# Patient Record
Sex: MALE | Race: WHITE | NOT HISPANIC OR LATINO
[De-identification: names, ages, dates, MRNs, and addresses within clinical notes are randomized per-mention and may not be internally consistent; named-entity substitution may affect disease eponyms.]

---

## 2018-07-10 PROBLEM — Z00.00 ENCOUNTER FOR PREVENTIVE HEALTH EXAMINATION: Status: ACTIVE | Noted: 2018-07-10

## 2018-07-17 ENCOUNTER — APPOINTMENT (OUTPATIENT)
Dept: MRI IMAGING | Facility: HOSPITAL | Age: 23
End: 2018-07-17
Payer: COMMERCIAL

## 2018-07-17 ENCOUNTER — OUTPATIENT (OUTPATIENT)
Dept: OUTPATIENT SERVICES | Facility: HOSPITAL | Age: 23
LOS: 1 days | End: 2018-07-17
Payer: COMMERCIAL

## 2018-07-17 DIAGNOSIS — Z00.8 ENCOUNTER FOR OTHER GENERAL EXAMINATION: ICD-10-CM

## 2018-07-17 PROCEDURE — 73221 MRI JOINT UPR EXTREM W/O DYE: CPT | Mod: 26,RT

## 2018-07-17 PROCEDURE — 73221 MRI JOINT UPR EXTREM W/O DYE: CPT

## 2018-08-07 ENCOUNTER — APPOINTMENT (OUTPATIENT)
Dept: ORTHOPEDIC SURGERY | Facility: CLINIC | Age: 23
End: 2018-08-07
Payer: COMMERCIAL

## 2018-08-07 DIAGNOSIS — S69.81XA OTHER SPECIFIED INJURIES OF RIGHT WRIST, HAND AND FINGER(S), INITIAL ENCOUNTER: ICD-10-CM

## 2018-08-07 PROCEDURE — 99203 OFFICE O/P NEW LOW 30 MIN: CPT

## 2018-08-16 ENCOUNTER — APPOINTMENT (OUTPATIENT)
Dept: RADIOLOGY | Facility: HOSPITAL | Age: 23
End: 2018-08-16
Payer: COMMERCIAL

## 2018-08-16 ENCOUNTER — OUTPATIENT (OUTPATIENT)
Dept: OUTPATIENT SERVICES | Facility: HOSPITAL | Age: 23
LOS: 1 days | End: 2018-08-16
Payer: COMMERCIAL

## 2018-08-16 DIAGNOSIS — Z00.8 ENCOUNTER FOR OTHER GENERAL EXAMINATION: ICD-10-CM

## 2018-08-16 PROCEDURE — 73630 X-RAY EXAM OF FOOT: CPT | Mod: 26,RT

## 2018-08-16 PROCEDURE — 73630 X-RAY EXAM OF FOOT: CPT

## 2019-07-16 ENCOUNTER — APPOINTMENT (OUTPATIENT)
Dept: ORTHOPEDIC SURGERY | Facility: CLINIC | Age: 24
End: 2019-07-16
Payer: COMMERCIAL

## 2019-07-16 DIAGNOSIS — M77.8 OTHER ENTHESOPATHIES, NOT ELSEWHERE CLASSIFIED: ICD-10-CM

## 2019-07-16 PROCEDURE — 99213 OFFICE O/P EST LOW 20 MIN: CPT

## 2019-07-16 NOTE — ADDENDUM
[FreeTextEntry1] : I, Ade Perdomo wrote this note acting as a scribe for Dr. Andrew Crowder on Jul 16, 2019.

## 2019-07-16 NOTE — DISCUSSION/SUMMARY
[de-identified] : The patient wishes to proceed with physical therapy. A script was given. \par Continue with the wrist braces. \par Avoid strenuous activities.  \par NSAIDs as tolerated. \par Patient may return for a cortisone injection if his symptoms persist or worsen.

## 2019-07-16 NOTE — PHYSICAL EXAM
[de-identified] : Patient is well-nourished, well developed, alert and in no acute distress. Breathing is unlabored. He is grossly oriented to person, place and time.\par \par Right Wrist: Wrist brace removed. There is tenderness of the ulnar styloid. No edema or discolorations. FROM with pain on resisted wrist extension. Sensation is normal to light touch. \par Strength: extension, flexion, ulnar deviation and radial deviation 5/5. \par \par Left Wrist: Wrist brace removed for examination. There is tenderness along the dorsi-ulnar aspect. No swelling or deformities. The ROM is full and painless in all planes with no crepitus. There is no joint instability on provocative testing. \par Strength: extension, flexion, ulnar deviation and radial deviation 5/5.

## 2019-07-16 NOTE — HISTORY OF PRESENT ILLNESS
[de-identified] : The patient is a RHD 24 year old male who presents for evaluation of pain involving the right and left wrist  that developed in April 2018. He was last seen in this office on August 2018. The patient states that his symptoms were of sudden onset. Since then, he states that wrists were doing much better until he shoveled snow this past winter and re-aggravated his symptoms. The right wrist is currently more painful than the left. He wears a wrist brace over both. The symptoms are localized over the dorsi-ulnar aspect and particularly over the ulnar styloid on the right. The right wrist also swells. The patient states his symptoms are worsened by lifting. 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

## 2019-07-16 NOTE — END OF VISIT
[FreeTextEntry3] : I, Andrew Crowder MD, ordering physician, have read and attest that all the information, medical decision making and discharge instructions within are true and accurate.

## 2020-07-02 ENCOUNTER — TRANSCRIPTION ENCOUNTER (OUTPATIENT)
Age: 25
End: 2020-07-02

## 2022-08-24 ENCOUNTER — EMERGENCY (EMERGENCY)
Facility: HOSPITAL | Age: 27
LOS: 1 days | Discharge: HOME | End: 2022-08-24
Attending: EMERGENCY MEDICINE | Admitting: EMERGENCY MEDICINE
Payer: MEDICAID

## 2022-08-24 VITALS
SYSTOLIC BLOOD PRESSURE: 149 MMHG | HEART RATE: 85 BPM | DIASTOLIC BLOOD PRESSURE: 98 MMHG | OXYGEN SATURATION: 98 % | RESPIRATION RATE: 18 BRPM

## 2022-08-24 VITALS
RESPIRATION RATE: 17 BRPM | WEIGHT: 259.93 LBS | TEMPERATURE: 98 F | DIASTOLIC BLOOD PRESSURE: 106 MMHG | SYSTOLIC BLOOD PRESSURE: 167 MMHG | HEART RATE: 79 BPM | HEIGHT: 75 IN | OXYGEN SATURATION: 97 %

## 2022-08-24 PROCEDURE — 99283 EMERGENCY DEPT VISIT LOW MDM: CPT

## 2022-08-24 PROCEDURE — 73110 X-RAY EXAM OF WRIST: CPT | Mod: 26,RT

## 2022-08-24 PROCEDURE — 73110 X-RAY EXAM OF WRIST: CPT

## 2022-08-24 NOTE — ED PROVIDER NOTE - PATIENT PORTAL LINK FT
You can access the FollowMyHealth Patient Portal offered by [facility] by registering at the following website: [email or web address]. By joining [facility]’s FollowMyHealth portal, you will also be able to view your health information using other applications (apps) compatible with our system.

## 2022-08-24 NOTE — ED PROVIDER NOTE - NSFOLLOWUPINSTRUCTIONS_ED_ALL_ED_FT
- wear wrist splint  - see orthopedist for follow up this week  - take motrin as needed for pain      Wrist Sprain    WHAT YOU NEED TO KNOW:    A wrist sprain happens when one or more ligaments in your wrist stretch or tear. Ligaments are tough tissues that connect bones and keep them in place, and support your joints.    DISCHARGE INSTRUCTIONS:    Return to the emergency department if:   •You have severe pain or swelling.      •Your injured wrist is red or has red streaks spreading from the injured area.      •You have new trouble moving your hands, fingers, or wrist.      •Your wrist, hand, or fingers feel cold or numb.      •Your fingernails turn blue or gray.      Call your doctor if:   •Your symptoms get worse.      •You have pain and swelling for more than 48 hours.      •You have questions or concerns about your condition or care.      Medicines: You may need any of the following:   •NSAIDs, such as ibuprofen, help decrease swelling, pain, and fever. NSAIDs can cause stomach bleeding or kidney problems in certain people. If you take blood thinner medicine, always ask your healthcare provider if NSAIDs are safe for you. Always read the medicine label and follow directions.      •Acetaminophen decreases pain and fever. It is available without a doctor's order. Ask how much to take and how often to take it. Follow directions. Read the labels of all other medicines you are using to see if they also contain acetaminophen, or ask your doctor or pharmacist. Acetaminophen can cause liver damage if not taken correctly.      •Take your medicine as directed. Contact your healthcare provider if you think your medicine is not helping or if you have side effects. Tell him or her if you are allergic to any medicine. Keep a list of the medicines, vitamins, and herbs you take. Include the amounts, and when and why you take them. Bring the list or the pill bottles to follow-up visits. Carry your medicine list with you in case of an emergency.      Self-care:   •Rest your wrist for at least 48 hours. Avoid activities that cause pain.      •Ice your wrist for 15 to 20 minutes every hour or as directed. Use an ice pack, or put crushed ice in a plastic bag. Cover it with a towel before you put it on your wrist. Ice helps prevent tissue damage and decreases swelling and pain.      •Compress your wrist with an elastic bandage. This will help decrease swelling, support your wrist, and help it heal. Wear your wrist wrap as directed. The elastic bandage should be snug but not tight.  How to Wrap an Elastic Bandage           •Elevate your wrist above the level of your heart as often as you can. This will help decrease swelling and pain. Prop your wrist on pillows or blankets to keep it elevated comfortably.             Wrist support: You may need to wear a splint or cast to support your wrist and prevent more damage. Wear your splint as directed. Ask for instructions on how to bathe while you are wearing a splint or cast.    Physical therapy: Your healthcare provider may recommend that you go to physical therapy. A physical therapist teaches you exercises to help improve movement and strength, and to decrease pain.    Follow up with your doctor as directed: Write down your questions so you remember to ask them during your visits.

## 2022-08-24 NOTE — ED ADULT TRIAGE NOTE - CHIEF COMPLAINT QUOTE
c/o right wrist pain with movement x 2 weeks. pt reports pain start after punching a punching bag but has been worsening since. pt states "I cannot use my hand or move my wrist to do anything."

## 2022-08-24 NOTE — ED PROVIDER NOTE - OBJECTIVE STATEMENT
pt c/o R wrist pain for ~8 days, started after injurying it punching a punching bag. pain now only on heavy lifting . no numbness/weakness

## 2022-08-24 NOTE — ED PROVIDER NOTE - CARE PROVIDER_API CALL
Andrew Crowder)  Orthopaedic Surgery  [address]  [place] [address]  Phone: [phone]  Fax: [phone]  Follow Up Time:

## 2022-08-24 NOTE — ED ADULT NURSE NOTE - NSIMPLEMENTINTERV_GEN_ALL_ED
Implemented All Universal Safety Interventions:  [place] to call system. Call bell, personal items and telephone within reach. Instruct patient to call for assistance. Room bathroom lighting operational. Non-slip footwear when patient is off stretcher. Physically safe environment: no spills, clutter or unnecessary equipment. Stretcher in lowest position, wheels locked, appropriate side rails in place.

## 2022-08-24 NOTE — ED PROVIDER NOTE - CLINICAL SUMMARY MEDICAL DECISION MAKING FREE TEXT BOX
R wrist pain p injury. check xr r/o fx. likely sprain.      xray neg. splint placed by RN. motrin prn. f/u ortho

## 2022-08-24 NOTE — ED ADULT NURSE NOTE - OBJECTIVE STATEMENT
pt axo3, coming in from home for right wrist pain. wrist assessed- no swelling or redness present. pt is able to move his wrist up and down and side to side. pt states he hurt his wrist x2 weeks ago punching a punching bag. pt denies chest pain or SOB. safety maintained.

## 2022-08-24 NOTE — ED PROVIDER NOTE - MUSCULOSKELETAL, MLM
R elbow nontender, FROM s pain. R wrist: no swelling or deformity/discoloration. no focal ttp. mild pain with wrist flexion against resistance. hand/fingers nontender. 2+ rad pulses

## 2022-12-21 PROBLEM — Z78.9 OTHER SPECIFIED HEALTH STATUS: Chronic | Status: ACTIVE | Noted: 2022-08-24

## 2023-04-05 ENCOUNTER — APPOINTMENT (OUTPATIENT)
Dept: NEUROLOGY | Facility: CLINIC | Age: 28
End: 2023-04-05
Payer: MEDICAID

## 2023-04-05 VITALS
WEIGHT: 260 LBS | SYSTOLIC BLOOD PRESSURE: 170 MMHG | BODY MASS INDEX: 33.37 KG/M2 | HEIGHT: 74 IN | DIASTOLIC BLOOD PRESSURE: 100 MMHG

## 2023-04-05 PROCEDURE — 99204 OFFICE O/P NEW MOD 45 MIN: CPT

## 2023-04-05 NOTE — ASSESSMENT
[FreeTextEntry1] : Likely attention deficit disorder\par We will obtain an EEG and formal neuropsychological evaluation\par Further discussions to follow

## 2023-04-05 NOTE — HISTORY OF PRESENT ILLNESS
[FreeTextEntry1] : He reports difficulty focusing and concentrating since childhood\par School was always a struggle.  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

## 2023-04-05 NOTE — PHYSICAL EXAM
[General Appearance - Alert] : alert [General Appearance - In No Acute Distress] : in no acute distress [General Appearance - Well-Appearing] : healthy appearing [Oriented To Time, Place, And Person] : oriented to person, place, and time [Impaired Insight] : insight and judgment were intact [Affect] : the affect was normal [Memory Recent] : recent memory was not impaired [Person] : oriented to person [Place] : oriented to place [Time] : oriented to time [Concentration Intact] : normal concentrating ability [Fluency] : fluency intact [Comprehension] : comprehension intact [Cranial Nerves Optic (II)] : visual acuity intact bilaterally,  visual fields full to confrontation, pupils equal round and reactive to light [Cranial Nerves Oculomotor (III)] : extraocular motion intact [Cranial Nerves Trigeminal (V)] : facial sensation intact symmetrically [Cranial Nerves Facial (VII)] : face symmetrical [Cranial Nerves Vestibulocochlear (VIII)] : hearing was intact bilaterally [Cranial Nerves Glossopharyngeal (IX)] : tongue and palate midline [Cranial Nerves Accessory (XI - Cranial And Spinal)] : head turning and shoulder shrug symmetric [Cranial Nerves Hypoglossal (XII)] : there was no tongue deviation with protrusion [Motor Tone] : muscle tone was normal in all four extremities [Motor Strength] : muscle strength was normal in all four extremities [No Muscle Atrophy] : normal bulk in all four extremities [Paresis Pronator Drift Right-Sided] : no pronator drift on the right [Sensation Tactile Decrease] : light touch was intact [Sensation Pain / Temperature Decrease] : pain and temperature was intact [Romberg's Sign] : Romberg's sign was negtive [Abnormal Walk] : normal gait [Balance] : balance was intact [Past-pointing] : there was no past-pointing [Tremor] : no tremor present [Coordination - Dysmetria Impaired Finger-to-Nose Bilateral] : not present [Coordination - Dysmetria Impaired Heel-to-Shin Bilateral] : not present [2+] : Ankle jerk left 2+ [Plantar Reflex Right Only] : normal on the right [Plantar Reflex Left Only] : normal on the left [PERRL With Normal Accommodation] : pupils were equal in size, round, reactive to light, with normal accommodation [Extraocular Movements] : extraocular movements were intact [Full Visual Field] : full visual field

## 2023-04-06 ENCOUNTER — APPOINTMENT (OUTPATIENT)
Dept: NEUROLOGY | Facility: CLINIC | Age: 28
End: 2023-04-06
Payer: MEDICAID

## 2023-04-06 PROCEDURE — 95819 EEG AWAKE AND ASLEEP: CPT

## 2023-04-06 PROCEDURE — 93040 RHYTHM ECG WITH REPORT: CPT

## 2024-05-13 ENCOUNTER — NON-APPOINTMENT (OUTPATIENT)
Age: 29
End: 2024-05-13

## 2024-05-13 ENCOUNTER — APPOINTMENT (OUTPATIENT)
Dept: FAMILY MEDICINE | Facility: CLINIC | Age: 29
End: 2024-05-13
Payer: COMMERCIAL

## 2024-05-13 VITALS
SYSTOLIC BLOOD PRESSURE: 150 MMHG | RESPIRATION RATE: 16 BRPM | TEMPERATURE: 98.8 F | BODY MASS INDEX: 36.7 KG/M2 | HEIGHT: 74 IN | WEIGHT: 286 LBS | HEART RATE: 92 BPM | OXYGEN SATURATION: 97 % | DIASTOLIC BLOOD PRESSURE: 90 MMHG

## 2024-05-13 VITALS — DIASTOLIC BLOOD PRESSURE: 84 MMHG | SYSTOLIC BLOOD PRESSURE: 127 MMHG

## 2024-05-13 DIAGNOSIS — Z80.1 FAMILY HISTORY OF MALIGNANT NEOPLASM OF TRACHEA, BRONCHUS AND LUNG: ICD-10-CM

## 2024-05-13 DIAGNOSIS — Z84.89 FAMILY HISTORY OF OTHER SPECIFIED CONDITIONS: ICD-10-CM

## 2024-05-13 DIAGNOSIS — F43.23 ADJUSTMENT DISORDER WITH MIXED ANXIETY AND DEPRESSED MOOD: ICD-10-CM

## 2024-05-13 DIAGNOSIS — Z82.49 FAMILY HISTORY OF ISCHEMIC HEART DISEASE AND OTHER DISEASES OF THE CIRCULATORY SYSTEM: ICD-10-CM

## 2024-05-13 DIAGNOSIS — F43.10 POST-TRAUMATIC STRESS DISORDER, UNSPECIFIED: ICD-10-CM

## 2024-05-13 DIAGNOSIS — Z83.3 FAMILY HISTORY OF DIABETES MELLITUS: ICD-10-CM

## 2024-05-13 PROCEDURE — G2211 COMPLEX E/M VISIT ADD ON: CPT

## 2024-05-13 PROCEDURE — 99204 OFFICE O/P NEW MOD 45 MIN: CPT

## 2024-05-14 NOTE — HISTORY OF PRESENT ILLNESS
[FreeTextEntry8] : 29-year-old male presenting for an initial visit to establish care and address concerns related to depression, potential ADHD, and gastrointestinal discomfort. Patient reports feeling depressed and has been unemployed since December, with intermittent unemployment over the past couple of years. His therapist suggested that he may have ADHD, characterized by an inability to focus, stay on task, and complete tasks due to frustration and loss of interest. He reports family issues and a history of trauma, including his father's suicide when he was 3 years old and estrangement from his paternal family after legally changing his last name. He experiences symptoms of depression nearly every day, as indicated by the PHQ-9 survey. He has had thoughts of being a burden and feeling like he would be better off not being here, but denies any specific suicidal ideation or self-harm attempts. When feeling down, he tries to immerse himself in activities he enjoys, such as 3D printing and modeling, but struggles to maintain focus. History of tonsillectomy. Paternal family estranged after patient legally changed his last name. Family history of substance abuse (alcoholism and drug use) on both sides of family. Multiple aunts and uncles  from cancer including lung cancer. One uncle  from a heart attack in . One aunt had a brain tumor and experienced cognitive impairment. Mother has diabetes. One family member had a stroke or palsy. Not currently sexually active. No concerns for STDs. Patient reports burping, especially when lying down or during workouts involving shallow breathing. He denies acid reflux or stomach bloating. Blood pressure 150/90 mmHg on intake, 127/84 on repeat.

## 2024-06-06 LAB
ALBUMIN SERPL ELPH-MCNC: 4.8 G/DL
ALP BLD-CCNC: 52 U/L
ALT SERPL-CCNC: 95 U/L
ANION GAP SERPL CALC-SCNC: 12 MMOL/L
AST SERPL-CCNC: 31 U/L
BILIRUB SERPL-MCNC: 0.6 MG/DL
BUN SERPL-MCNC: 14 MG/DL
CALCIUM SERPL-MCNC: 9.4 MG/DL
CHLORIDE SERPL-SCNC: 103 MMOL/L
CO2 SERPL-SCNC: 26 MMOL/L
CREAT SERPL-MCNC: 0.91 MG/DL
EGFR: 118 ML/MIN/1.73M2
GLUCOSE SERPL-MCNC: 75 MG/DL
HCT VFR BLD CALC: 46.8 %
HGB BLD-MCNC: 16.2 G/DL
MCHC RBC-ENTMCNC: 29.5 PG
MCHC RBC-ENTMCNC: 34.6 GM/DL
MCV RBC AUTO: 85.1 FL
PLATELET # BLD AUTO: 214 K/UL
POTASSIUM SERPL-SCNC: 4.3 MMOL/L
PROT SERPL-MCNC: 7.1 G/DL
RBC # BLD: 5.5 M/UL
RBC # FLD: 12.5 %
SODIUM SERPL-SCNC: 141 MMOL/L
TSH SERPL-ACNC: 0.71 UIU/ML
WBC # FLD AUTO: 6.05 K/UL

## 2024-06-10 ENCOUNTER — TRANSCRIPTION ENCOUNTER (OUTPATIENT)
Age: 29
End: 2024-06-10

## 2024-06-10 ENCOUNTER — APPOINTMENT (OUTPATIENT)
Dept: FAMILY MEDICINE | Facility: CLINIC | Age: 29
End: 2024-06-10
Payer: COMMERCIAL

## 2024-06-10 VITALS — DIASTOLIC BLOOD PRESSURE: 88 MMHG | SYSTOLIC BLOOD PRESSURE: 132 MMHG

## 2024-06-10 VITALS
WEIGHT: 287 LBS | DIASTOLIC BLOOD PRESSURE: 98 MMHG | TEMPERATURE: 97.3 F | SYSTOLIC BLOOD PRESSURE: 149 MMHG | RESPIRATION RATE: 16 BRPM | BODY MASS INDEX: 36.83 KG/M2 | OXYGEN SATURATION: 98 % | HEART RATE: 86 BPM | HEIGHT: 74 IN

## 2024-06-10 DIAGNOSIS — R41.840 ATTENTION AND CONCENTRATION DEFICIT: ICD-10-CM

## 2024-06-10 DIAGNOSIS — F32.A DEPRESSION, UNSPECIFIED: ICD-10-CM

## 2024-06-10 DIAGNOSIS — E66.9 OBESITY, UNSPECIFIED: ICD-10-CM

## 2024-06-10 DIAGNOSIS — L72.11 PILAR CYST: ICD-10-CM

## 2024-06-10 PROCEDURE — 99214 OFFICE O/P EST MOD 30 MIN: CPT

## 2024-06-10 PROCEDURE — G2211 COMPLEX E/M VISIT ADD ON: CPT

## 2024-06-10 RX ORDER — FLUOXETINE HYDROCHLORIDE 20 MG/1
20 TABLET ORAL
Qty: 90 | Refills: 3 | Status: ACTIVE | COMMUNITY
Start: 2024-05-13 | End: 1900-01-01

## 2024-06-10 NOTE — HISTORY OF PRESENT ILLNESS
[de-identified] : 29-year-old male presents for ongoing management of chronic medical conditions. He was started on fluoxetine 10 mg daily at his last visit and was given a neuropsychological evaluation to test for ADHD. Patient reports feeling less sad on fluoxetine and 5-HTP supplement. However, he experiences periods of feeling flat, edgy, and agitated at times. Suicidal thoughts have lessened but are still present, though he has no intent to act on them. Patient had difficulty obtaining referrals for ADHD evaluation due to insurance issues. He struggles with motivation for exercise and healthy habits, attributing it to potential ADHD and ruminating negative thoughts during workouts. Patient mentions a past relationship contributing to weight gain. He previously lost significant weight through restrictive dieting and excessive exercise, going from 300 lbs to 180 lbs. He engages in activities like playing games with friends for dopamine release. Recent blood test results showed ALT of 95. Other metabolic panel, kidney function, thyroid, and blood count results were within normal limits.

## 2024-06-10 NOTE — PHYSICAL EXAM
[Normal] : affect was normal and insight and judgment were intact [de-identified] : Two smooth, mobile, round cysts were observed on the patient's cranium, likely pilar cysts, one on each side.

## 2024-07-17 ENCOUNTER — APPOINTMENT (OUTPATIENT)
Dept: NEUROLOGY | Facility: CLINIC | Age: 29
End: 2024-07-17

## 2024-07-17 VITALS
WEIGHT: 280 LBS | HEART RATE: 93 BPM | SYSTOLIC BLOOD PRESSURE: 170 MMHG | BODY MASS INDEX: 35.94 KG/M2 | OXYGEN SATURATION: 98 % | DIASTOLIC BLOOD PRESSURE: 120 MMHG | HEIGHT: 74 IN

## 2024-07-22 ENCOUNTER — NON-APPOINTMENT (OUTPATIENT)
Age: 29
End: 2024-07-22

## 2024-07-22 ENCOUNTER — APPOINTMENT (OUTPATIENT)
Dept: FAMILY MEDICINE | Facility: CLINIC | Age: 29
End: 2024-07-22
Payer: COMMERCIAL

## 2024-07-22 VITALS
RESPIRATION RATE: 16 BRPM | SYSTOLIC BLOOD PRESSURE: 137 MMHG | BODY MASS INDEX: 36.21 KG/M2 | DIASTOLIC BLOOD PRESSURE: 93 MMHG | HEART RATE: 90 BPM | TEMPERATURE: 98.8 F | OXYGEN SATURATION: 97 % | WEIGHT: 282 LBS

## 2024-07-22 DIAGNOSIS — R41.840 ATTENTION AND CONCENTRATION DEFICIT: ICD-10-CM

## 2024-07-22 DIAGNOSIS — E66.9 OBESITY, UNSPECIFIED: ICD-10-CM

## 2024-07-22 DIAGNOSIS — I10 ESSENTIAL (PRIMARY) HYPERTENSION: ICD-10-CM

## 2024-07-22 PROCEDURE — G2211 COMPLEX E/M VISIT ADD ON: CPT

## 2024-07-22 PROCEDURE — 99214 OFFICE O/P EST MOD 30 MIN: CPT

## 2024-07-22 RX ORDER — AMLODIPINE BESYLATE 5 MG/1
5 TABLET ORAL DAILY
Qty: 30 | Refills: 0 | Status: ACTIVE | COMMUNITY
Start: 2024-07-22 | End: 1900-01-01

## 2024-07-22 NOTE — HISTORY OF PRESENT ILLNESS
[de-identified] : 29-year-old male presented for ongoing management of chronic medical conditions, including follow-up on fluoxetine dosage adjustment and evaluation for ADHD. His blood pressures have been high as well and was noticed to be very high with his neurologist on July 17th. Patient's blood pressure was elevated at 137/93 during the current visit, previously 170/120 on July 17th, and 132/88 on June 10th. The high blood pressure reading of 170/120 was during an EEG appointment with Dr. Jolley, where the visit was canceled, but the high blood pressure was acknowledged. Regarding ADHD, he has spoken with a few placed that wanted to charge him a lot of money, facing challenges in finding the right provider and coordinating the process. Patient expresses frustration with the difficulties in focusing and completing schoolwork, leading to second-guessing himself. ALT: 95, AST: 31. EEG (previously done): Normal.

## 2024-08-12 ENCOUNTER — APPOINTMENT (OUTPATIENT)
Dept: FAMILY MEDICINE | Facility: CLINIC | Age: 29
End: 2024-08-12
Payer: COMMERCIAL

## 2024-08-12 VITALS
OXYGEN SATURATION: 96 % | HEART RATE: 95 BPM | RESPIRATION RATE: 16 BRPM | TEMPERATURE: 98.6 F | BODY MASS INDEX: 36.06 KG/M2 | WEIGHT: 281 LBS | SYSTOLIC BLOOD PRESSURE: 141 MMHG | DIASTOLIC BLOOD PRESSURE: 95 MMHG | HEIGHT: 74 IN

## 2024-08-12 DIAGNOSIS — R41.840 ATTENTION AND CONCENTRATION DEFICIT: ICD-10-CM

## 2024-08-12 DIAGNOSIS — I10 ESSENTIAL (PRIMARY) HYPERTENSION: ICD-10-CM

## 2024-08-12 PROCEDURE — G2211 COMPLEX E/M VISIT ADD ON: CPT

## 2024-08-12 PROCEDURE — 99214 OFFICE O/P EST MOD 30 MIN: CPT

## 2024-08-12 NOTE — HISTORY OF PRESENT ILLNESS
[de-identified] : 29-year-old male, presented for ongoing management of chronic medical conditions, including hypertension and difficulty with concentration. Patient was last seen on July 22nd, 2024, for hypertension and was started on amlodipine 5 mg daily. He was also given information for the [facility] for his difficulty with concentration. However, they told him that they do not diagnose. He is asking for further clarification. Patient reports no side effects from the amlodipine. Patient's blood pressure today is 141/95 mmHg, which is better than the previous reading of 170/120 mmHg. Patient is also taking fluoxetine and reports no issues with it. /95.

## 2025-05-20 ENCOUNTER — NON-APPOINTMENT (OUTPATIENT)
Age: 30
End: 2025-05-20

## 2025-05-22 ENCOUNTER — APPOINTMENT (OUTPATIENT)
Dept: FAMILY MEDICINE | Facility: CLINIC | Age: 30
End: 2025-05-22

## 2025-05-22 ENCOUNTER — TRANSCRIPTION ENCOUNTER (OUTPATIENT)
Age: 30
End: 2025-05-22

## 2025-05-22 VITALS
DIASTOLIC BLOOD PRESSURE: 87 MMHG | BODY MASS INDEX: 38.37 KG/M2 | HEART RATE: 94 BPM | TEMPERATURE: 98 F | OXYGEN SATURATION: 99 % | WEIGHT: 299 LBS | SYSTOLIC BLOOD PRESSURE: 146 MMHG | RESPIRATION RATE: 16 BRPM | HEIGHT: 74 IN

## 2025-05-22 DIAGNOSIS — R41.3 OTHER AMNESIA: ICD-10-CM

## 2025-05-22 DIAGNOSIS — R45.4 IRRITABILITY AND ANGER: ICD-10-CM

## 2025-05-22 DIAGNOSIS — R53.83 OTHER FATIGUE: ICD-10-CM

## 2025-05-22 DIAGNOSIS — R79.89 OTHER SPECIFIED ABNORMAL FINDINGS OF BLOOD CHEMISTRY: ICD-10-CM

## 2025-05-22 DIAGNOSIS — G47.00 INSOMNIA, UNSPECIFIED: ICD-10-CM

## 2025-05-22 DIAGNOSIS — I10 ESSENTIAL (PRIMARY) HYPERTENSION: ICD-10-CM

## 2025-05-22 LAB
25(OH)D3 SERPL-MCNC: 15.1 NG/ML
ALBUMIN SERPL ELPH-MCNC: 4.9 G/DL
ALP BLD-CCNC: 58 U/L
ALT SERPL-CCNC: 98 U/L
ANION GAP SERPL CALC-SCNC: 13 MMOL/L
AST SERPL-CCNC: 35 U/L
BILIRUB SERPL-MCNC: 0.4 MG/DL
BUN SERPL-MCNC: 16 MG/DL
CALCIUM SERPL-MCNC: 10.2 MG/DL
CHLORIDE SERPL-SCNC: 99 MMOL/L
CHOLEST SERPL-MCNC: 209 MG/DL
CO2 SERPL-SCNC: 27 MMOL/L
CREAT SERPL-MCNC: 1.09 MG/DL
CRP SERPL-MCNC: 9 MG/L
EGFRCR SERPLBLD CKD-EPI 2021: 94 ML/MIN/1.73M2
ERYTHROCYTE [SEDIMENTATION RATE] IN BLOOD BY WESTERGREN METHOD: 20 MM/HR
ESTIMATED AVERAGE GLUCOSE: 103 MG/DL
GLUCOSE SERPL-MCNC: 98 MG/DL
HBA1C MFR BLD HPLC: 5.2 %
HCT VFR BLD CALC: 48 %
HDLC SERPL-MCNC: 51 MG/DL
HGB BLD-MCNC: 16.5 G/DL
LDLC SERPL-MCNC: 144 MG/DL
MCHC RBC-ENTMCNC: 29.5 PG
MCHC RBC-ENTMCNC: 34.4 G/DL
MCV RBC AUTO: 85.9 FL
NONHDLC SERPL-MCNC: 158 MG/DL
PLATELET # BLD AUTO: 237 K/UL
POTASSIUM SERPL-SCNC: 5.5 MMOL/L
PROT SERPL-MCNC: 7.4 G/DL
RBC # BLD: 5.59 M/UL
RBC # FLD: 12.3 %
RHEUMATOID FACT SER QL: <10 IU/ML
SODIUM SERPL-SCNC: 139 MMOL/L
TRIGL SERPL-MCNC: 76 MG/DL
TSH SERPL-ACNC: 0.56 UIU/ML
WBC # FLD AUTO: 6.45 K/UL

## 2025-05-22 PROCEDURE — 99395 PREV VISIT EST AGE 18-39: CPT

## 2025-05-23 LAB
EBV EA AB SER IA-ACNC: <5 U/ML
EBV EA AB TITR SER IF: NEGATIVE
EBV EA IGG SER QL IA: <3 U/ML
EBV EA IGG SER-ACNC: NEGATIVE
EBV EA IGM SER IA-ACNC: NEGATIVE
EBV PATRN SPEC IB-IMP: NORMAL
EBV VCA IGG SER IA-ACNC: <10 U/ML
EBV VCA IGM SER QL IA: 17 U/ML
EPSTEIN-BARR VIRUS CAPSID ANTIGEN IGG: NEGATIVE

## 2025-05-26 LAB
A PHAGOCYTOPH IGG TITR SER IF: NORMAL
B BURGDOR AB SER QL IA: 0.18 IV
B MICROTI IGG TITR SER: NORMAL
E CHAFFEENSIS IGG TITR SER IF: NORMAL

## 2025-05-27 LAB
ANA SER IF-ACNC: NEGATIVE
DENV IGG SERPL QL IA: <1 ISR
DENV IGM SERPL QL IA: <1 ISR

## 2025-06-02 ENCOUNTER — TRANSCRIPTION ENCOUNTER (OUTPATIENT)
Age: 30
End: 2025-06-02

## 2025-07-23 ENCOUNTER — NON-APPOINTMENT (OUTPATIENT)
Age: 30
End: 2025-07-23

## 2025-09-03 ENCOUNTER — RX RENEWAL (OUTPATIENT)
Age: 30
End: 2025-09-03

## 2025-09-03 RX ORDER — FLUOXETINE HYDROCHLORIDE 20 MG/1
20 CAPSULE ORAL
Qty: 90 | Refills: 3 | Status: ACTIVE | COMMUNITY
Start: 2025-09-03 | End: 1900-01-01